# Patient Record
Sex: MALE | Race: WHITE | NOT HISPANIC OR LATINO | Employment: OTHER | URBAN - METROPOLITAN AREA
[De-identification: names, ages, dates, MRNs, and addresses within clinical notes are randomized per-mention and may not be internally consistent; named-entity substitution may affect disease eponyms.]

---

## 2022-07-01 ENCOUNTER — OCCMED (OUTPATIENT)
Dept: URGENT CARE | Facility: CLINIC | Age: 57
End: 2022-07-01
Payer: OTHER MISCELLANEOUS

## 2022-07-01 DIAGNOSIS — G56.32 RADIAL NERVE PALSY, LEFT: Primary | ICD-10-CM

## 2022-07-01 PROCEDURE — 99283 EMERGENCY DEPT VISIT LOW MDM: CPT

## 2022-07-01 PROCEDURE — G0382 LEV 3 HOSP TYPE B ED VISIT: HCPCS

## 2022-07-07 ENCOUNTER — APPOINTMENT (OUTPATIENT)
Dept: URGENT CARE | Facility: CLINIC | Age: 57
End: 2022-07-07
Payer: OTHER MISCELLANEOUS

## 2022-07-07 PROCEDURE — 99213 OFFICE O/P EST LOW 20 MIN: CPT | Performed by: FAMILY MEDICINE

## 2022-07-20 ENCOUNTER — HOSPITAL ENCOUNTER (EMERGENCY)
Facility: HOSPITAL | Age: 57
Discharge: HOME | End: 2022-07-20
Attending: EMERGENCY MEDICINE

## 2022-07-20 VITALS
SYSTOLIC BLOOD PRESSURE: 128 MMHG | DIASTOLIC BLOOD PRESSURE: 77 MMHG | HEART RATE: 98 BPM | TEMPERATURE: 98.1 F | RESPIRATION RATE: 20 BRPM | OXYGEN SATURATION: 95 %

## 2022-07-20 DIAGNOSIS — F10.929 ALCOHOL INTOXICATION (HCC): Primary | ICD-10-CM

## 2022-07-20 DIAGNOSIS — F10.10 ALCOHOL ABUSE: ICD-10-CM

## 2022-07-20 LAB
ETHANOL EXG-MCNC: 0.22 MG/DL
ETHANOL EXG-MCNC: 0.28 MG/DL

## 2022-07-20 PROCEDURE — 96372 THER/PROPH/DIAG INJ SC/IM: CPT

## 2022-07-20 PROCEDURE — 99285 EMERGENCY DEPT VISIT HI MDM: CPT | Performed by: EMERGENCY MEDICINE

## 2022-07-20 PROCEDURE — 82075 ASSAY OF BREATH ETHANOL: CPT | Performed by: EMERGENCY MEDICINE

## 2022-07-20 PROCEDURE — 99284 EMERGENCY DEPT VISIT MOD MDM: CPT

## 2022-07-20 RX ORDER — LORAZEPAM 2 MG/ML
1 INJECTION INTRAMUSCULAR ONCE
Status: COMPLETED | OUTPATIENT
Start: 2022-07-20 | End: 2022-07-20

## 2022-07-20 RX ORDER — NICOTINE 21 MG/24HR
14 PATCH, TRANSDERMAL 24 HOURS TRANSDERMAL ONCE
Status: DISCONTINUED | OUTPATIENT
Start: 2022-07-20 | End: 2022-07-20 | Stop reason: HOSPADM

## 2022-07-20 RX ADMIN — NICOTINE 14 MG: 14 PATCH, EXTENDED RELEASE TRANSDERMAL at 18:04

## 2022-07-20 RX ADMIN — LORAZEPAM 1 MG: 2 INJECTION INTRAMUSCULAR; INTRAVENOUS at 18:04

## 2022-07-20 NOTE — ED PROVIDER NOTES
Pt Name: Lissa Roland  MRN: [id number]  [place] 1965  Age/Sex: 65 y o  male  Date of evaluation: 7/20/2022  PCP: No primary care provider on file  CHIEF COMPLAINT    Chief Complaint   Patient presents with    Alcohol Intoxication     Per EMS "pt is too drunk for recovery center"          HPI    65 y o  male presenting with alcohol intoxication  Patient states he has been binge drinking over the past 3 weeks after a long period of sobriety, went to [facility] amount recovery for alcohol rehabilitation, his breath alcohol test exceeded the threshold and he was sent to the ER for monitoring until his blood alcohol level is less than 0 26  Patient denies any complaints at this time  HPI      Past Medical and Surgical History    Pertinent medical history of alcohol abuse  History reviewed  No pertinent surgical history  History reviewed  No pertinent family history  Social History     Tobacco Use    Smoking status: Unknown If Ever Smoked   Substance Use Topics    Alcohol use: Yes           Allergies    No Known Allergies    Home Medications    Prior to Admission medications    Not on File           Review of Systems    Review of Systems   Constitutional: Negative for appetite change, chills and diaphoresis  HENT: Negative for drooling, facial swelling, trouble swallowing and voice change  Respiratory: Negative for apnea, shortness of breath and wheezing  Cardiovascular: Negative for chest pain and leg swelling  Gastrointestinal: Negative for abdominal distention, abdominal pain, diarrhea, nausea and vomiting  Genitourinary: Negative for dysuria and urgency  Musculoskeletal: Negative for arthralgias, back pain, gait problem and neck pain  Skin: Negative for color change, rash and wound  Neurological: Negative for seizures, speech difficulty, weakness and headaches  Psychiatric/Behavioral: Positive for confusion and dysphoric mood   Negative for agitation and behavioral problems  The patient is not nervous/anxious  All other systems reviewed and negative  Physical Exam      ED Triage Vitals [07/20/22 1359]   Temperature Pulse Respirations Blood Pressure SpO2   98 1 °F (36 7 °C) (!) 109 20 127/79 97 %      Temp Source Heart Rate Source Patient Position - Orthostatic VS BP Location FiO2 (%)   Oral Monitor Lying Right arm --      Pain Score       No Pain               Physical Exam  Vitals and nursing note reviewed  Constitutional:       General: He is not in acute distress  Appearance: He is well-developed  He is not ill-appearing, toxic-appearing or diaphoretic  HENT:      Head: Normocephalic and atraumatic  Right Ear: External ear normal       Left Ear: External ear normal    Eyes:      Conjunctiva/sclera: Conjunctivae normal       Pupils: Pupils are equal, round, and reactive to light  Neck:      Trachea: No tracheal deviation  Cardiovascular:      Rate and Rhythm: Normal rate and regular rhythm  Heart sounds: Normal heart sounds  No murmur heard  Pulmonary:      Effort: Pulmonary effort is normal  No respiratory distress  Breath sounds: Normal breath sounds  No stridor  No wheezing or rales  Abdominal:      General: There is no distension  Palpations: Abdomen is soft  Tenderness: There is no abdominal tenderness  There is no guarding or rebound  Musculoskeletal:         General: No deformity  Normal range of motion  Cervical back: Normal range of motion and neck supple  Skin:     General: Skin is warm and dry  Findings: No rash  Neurological:      Mental Status: He is alert and oriented to person, place, and time  Comments: Patient oriented to self, situation, time, date, slurring speech slightly, appears intoxicated  Psychiatric:         Behavior: Behavior normal          Thought Content:  Thought content normal          Judgment: Judgment normal               Diagnostic Results      Labs:    Results Reviewed     Procedure Component Value Units Date/Time    POCT alcohol breath test [[id number]]  (Normal) Resulted: 07/20/22 1746    Lab Status: Final result Updated: 07/20/22 1747     EXTBreath Alcohol 0 216    POCT alcohol breath test [[id number]]  (Normal) Resulted: 07/20/22 1515    Lab Status: Final result Updated: 07/20/22 1549     EXTBreath Alcohol 0 284          All labs reviewed and utilized in the medical decision making process    Radiology:    No orders to display       All radiology studies independently viewed by me and interpreted by the radiologist     Procedure    Procedures        ED Course of Care and Re-Assessments      Patient monitored in emergency department, 1 point became somewhat anxious and tearful and accepted some Ativan, was also given nicotine patch after requesting to go out and smoke  Blood alcohol level repeated, beneath threshold, returned to rehab facility to enter alcohol rehab  Transportation provided by the facility  Medications   nicotine (NICODERM CQ) 14 mg/24hr TD 24 hr patch 14 mg (14 mg Transdermal Medication Applied 7/20/22 1804)   LORazepam (ATIVAN) injection 1 mg (1 mg Intramuscular Given 7/20/22 1804)           FINAL IMPRESSION    Final diagnoses:   [facility]   Alcohol abuse         DISPOSITION/PLAN    Presentation as above with alcohol intoxication the setting of alcohol abuse  Vital signs reassuring, examination as above consistent with alcohol intoxication  Observed emergency depart without significant incident, hemodynamically stable and comfortable at time of departure to rehab facility with their staff    Time reflects when diagnosis was documented in both MDM as applicable and the Disposition within this note     Time User Action Codes Description Comment    7/20/2022  5:45 PM Ayo Lovell Add [F10 929] Alcohol intoxication ([facility] )     7/20/2022  5:45 PM Shainata Nas Add [F10 10] Alcohol abuse       ED Disposition     ED Disposition   Discharge    Condition   Stable    Date/Time   Wed Jul 20, 2022  5:45 PM    Comment   Fernando Jocelyn discharge to home/self care  Follow-up Information     Follow up With Specialties Details Why Contact Info Additional [address] [facility] Emergency Department Emergency Medicine Go to  If symptoms worsen [address] [id number] [facility] Emergency Department, [address], [place], [address] today To enter rehabilitation              PATIENT REFERRED TO:    [address] [facility] Emergency Department  [address] [address] [phone]  Go to   If symptoms worsen    [facility] today  To enter rehabilitation      DISCHARGE MEDICATIONS:    There are no discharge medications for this patient  No discharge procedures on file  John Ramírez MD    Portions of the record may have been created with voice recognition software  Occasional wrong word or "sound alike" substitutions may have occurred due to the inherent limitations of voice recognition software    Please read the chart carefully and recognize, using context, where substitutions have occurred     John Ramírez MD  07/20/22 1921

## 2022-07-20 NOTE — ED NOTES
[facility] aware of BAT result of 0 216, will send for pickup       Singh Flores, TRU  07/20/22 1221